# Patient Record
Sex: FEMALE
[De-identification: names, ages, dates, MRNs, and addresses within clinical notes are randomized per-mention and may not be internally consistent; named-entity substitution may affect disease eponyms.]

---

## 2018-10-22 ENCOUNTER — HOSPITAL ENCOUNTER (OUTPATIENT)
Dept: HOSPITAL 31 - C.SDS | Age: 63
Discharge: HOME | End: 2018-10-22
Attending: UROLOGY
Payer: MEDICAID

## 2018-10-22 VITALS
SYSTOLIC BLOOD PRESSURE: 136 MMHG | HEART RATE: 72 BPM | DIASTOLIC BLOOD PRESSURE: 82 MMHG | RESPIRATION RATE: 18 BRPM | TEMPERATURE: 98 F

## 2018-10-22 VITALS — OXYGEN SATURATION: 100 %

## 2018-10-22 DIAGNOSIS — D49.4: Primary | ICD-10-CM

## 2018-10-22 PROCEDURE — 52235 CYSTOSCOPY AND TREATMENT: CPT

## 2018-10-22 PROCEDURE — 88305 TISSUE EXAM BY PATHOLOGIST: CPT

## 2018-10-22 NOTE — [id number].[id number]
Surgeon's Initial Post Op Note





- Surgeon's Notes


Surgeon: Raymond


Assistant: TAMMY


Type of Anesthesia: General LMA


Anesthesia Administered By: Staff


Pre-Operative Diagnosis: Bladder mass


Operative Findings: Solid mass R bladder well away from R ureteral orriffice


Post-Operative Diagnosis: same


Operation Performed: Turbt medium


Specimen/Specimens Removed: tumor


Estimated Blood Loss: EBL {In ML}: 0


Blood Products Given: N/A


Drains Used: No Drains


Post-Op Condition: Good


Date of Surgery/Procedure: 10/22/18


Time of Surgery/Procedure: 15:33

## 2018-10-30 NOTE — OP
PROCEDURE DATE:  10/22/2018



PREOPERATIVE DIAGNOSIS:  Bladder mass.



POSTOPERATIVE DIAGNOSIS:  Bladder mass.



PROCEDURE:  TURBT medium.



SURGEON:  Samson Andrade MD



FINDINGS:  A mass in the posterior wall of the bladder of unknown

significance.



DESCRIPTION OF PROCEDURE:  The procedure as follows:  The patient signed

the detailed informed consent.  After receiving full explanation of the

risks and complications of the procedure, she was then draped and prepped

in the usual manner in the lithotomy position.  She received prophylactic

antibiotics and was cystoscoped with a#21 Storz panendoscope.  The urethra

was normal.  The bladder was entered atraumatically with a single mass on

the prostate wall of the bladder.  It was raised and pedunculated, but did

not appear to be a normal transitional cell carcinoma.  The mass was then

resected, and the resected tissue was sent for pathologic analysis.  The

base was fulgurated extensively.  There was no injury to the ureteral

orifices or urethral sphincter.  The patient tolerated this procedure well.

It was elected not to leave the Melton catheter, and she was sent to

recovery room in good condition.





__________________________________________

Samson Andrade MD





DD:  10/29/2018 13:23:46

DT:  10/30/2018 1:18:28

Job # [id number]

## 2021-11-02 ENCOUNTER — NEW PATIENT (OUTPATIENT)
Dept: URBAN - METROPOLITAN AREA SURGICAL CENTER 72 | Facility: SURGICAL CENTER | Age: 66
End: 2021-11-02

## 2021-11-02 DIAGNOSIS — H25.813: ICD-10-CM

## 2021-11-02 DIAGNOSIS — H16.222: ICD-10-CM

## 2021-11-02 DIAGNOSIS — H43.393: ICD-10-CM

## 2021-11-02 DIAGNOSIS — H04.123: ICD-10-CM

## 2021-11-02 PROCEDURE — 92004 COMPRE OPH EXAM NEW PT 1/>: CPT

## 2021-11-02 ASSESSMENT — KERATOMETRY
OS_AXISANGLE_DEGREES: 139
OS_K2POWER_DIOPTERS: 46.25
OS_K1POWER_DIOPTERS: 46.00
OD_K1POWER_DIOPTERS: 46.00
OS_AXISANGLE2_DEGREES: 49
OD_K2POWER_DIOPTERS: 46.25
OD_AXISANGLE_DEGREES: 165
OD_AXISANGLE2_DEGREES: 75

## 2021-11-02 ASSESSMENT — VISUAL ACUITY
OS_CC: 20/40-1
OS_SC: 20/40
OD_SC: 20/40
OD_CC: 20/30-1

## 2021-11-02 ASSESSMENT — TONOMETRY
OS_IOP_MMHG: 16
OD_IOP_MMHG: 16

## 2022-05-05 ENCOUNTER — PROBLEM (OUTPATIENT)
Dept: URBAN - METROPOLITAN AREA SURGICAL CENTER 72 | Facility: SURGICAL CENTER | Age: 67
End: 2022-05-05

## 2022-05-05 DIAGNOSIS — H43.393: ICD-10-CM

## 2022-05-05 DIAGNOSIS — H25.813: ICD-10-CM

## 2022-05-05 PROCEDURE — 92012 INTRM OPH EXAM EST PATIENT: CPT

## 2022-05-05 ASSESSMENT — VISUAL ACUITY
OS_CC: 20/40
OD_CC: 20/40

## 2022-05-05 ASSESSMENT — TONOMETRY
OS_IOP_MMHG: 16
OD_IOP_MMHG: 16

## 2022-05-05 ASSESSMENT — KERATOMETRY
OS_AXISANGLE_DEGREES: 139
OD_K1POWER_DIOPTERS: 46.00
OD_K2POWER_DIOPTERS: 46.25
OS_AXISANGLE2_DEGREES: 49
OD_AXISANGLE2_DEGREES: 75
OD_AXISANGLE_DEGREES: 165
OS_K2POWER_DIOPTERS: 46.25
OS_K1POWER_DIOPTERS: 46.00

## 2022-09-19 ENCOUNTER — ESTABLISHED COMPREHENSIVE EXAM (OUTPATIENT)
Dept: URBAN - METROPOLITAN AREA SURGICAL CENTER 72 | Facility: SURGICAL CENTER | Age: 67
End: 2022-09-19

## 2022-09-19 DIAGNOSIS — H16.222: ICD-10-CM

## 2022-09-19 DIAGNOSIS — H52.222: ICD-10-CM

## 2022-09-19 DIAGNOSIS — H52.03: ICD-10-CM

## 2022-09-19 DIAGNOSIS — H43.393: ICD-10-CM

## 2022-09-19 DIAGNOSIS — H52.4: ICD-10-CM

## 2022-09-19 DIAGNOSIS — H25.813: ICD-10-CM

## 2022-09-19 DIAGNOSIS — H04.123: ICD-10-CM

## 2022-09-19 PROCEDURE — 92015 DETERMINE REFRACTIVE STATE: CPT

## 2022-09-19 PROCEDURE — 92014 COMPRE OPH EXAM EST PT 1/>: CPT

## 2022-09-19 ASSESSMENT — KERATOMETRY
OS_AXISANGLE_DEGREES: 139
OD_K2POWER_DIOPTERS: 46.25
OS_K2POWER_DIOPTERS: 46.25
OS_AXISANGLE2_DEGREES: 49
OD_AXISANGLE_DEGREES: 165
OS_K1POWER_DIOPTERS: 46.00
OD_AXISANGLE2_DEGREES: 75
OD_K1POWER_DIOPTERS: 46.00

## 2022-09-19 ASSESSMENT — VISUAL ACUITY
OD_SC: 20/25-2
OS_CC: 20/40
OD_CC: 20/40
OS_SC: 20/30-2

## 2022-09-19 ASSESSMENT — TONOMETRY
OS_IOP_MMHG: 16
OD_IOP_MMHG: 16

## 2024-10-03 ENCOUNTER — ESTABLISHED COMPREHENSIVE EXAM (OUTPATIENT)
Dept: URBAN - METROPOLITAN AREA SURGICAL CENTER 72 | Facility: SURGICAL CENTER | Age: 69
End: 2024-10-03

## 2024-10-03 DIAGNOSIS — E11.9: ICD-10-CM

## 2024-10-03 DIAGNOSIS — H43.393: ICD-10-CM

## 2024-10-03 DIAGNOSIS — H25.813: ICD-10-CM

## 2024-10-03 DIAGNOSIS — H16.222: ICD-10-CM

## 2024-10-03 DIAGNOSIS — H04.123: ICD-10-CM

## 2024-10-03 PROCEDURE — 99214 OFFICE O/P EST MOD 30 MIN: CPT

## 2024-10-03 ASSESSMENT — KERATOMETRY
OS_AXISANGLE_DEGREES: 111
OS_K2POWER_DIOPTERS: 46.00
OS_K1POWER_DIOPTERS: 45.75
OS_AXISANGLE2_DEGREES: 21
OD_AXISANGLE2_DEGREES: 73
OD_K2POWER_DIOPTERS: 46.50
OD_AXISANGLE_DEGREES: 163
OD_K1POWER_DIOPTERS: 46.00

## 2024-10-03 ASSESSMENT — VISUAL ACUITY
OD_CC: 20/50
OS_CC: 20/50

## 2024-10-03 ASSESSMENT — TONOMETRY
OS_IOP_MMHG: 15
OD_IOP_MMHG: 17

## 2024-10-24 ENCOUNTER — FOLLOW UP (OUTPATIENT)
Dept: URBAN - METROPOLITAN AREA SURGICAL CENTER 72 | Facility: SURGICAL CENTER | Age: 69
End: 2024-10-24

## 2024-10-24 DIAGNOSIS — H52.4: ICD-10-CM

## 2024-10-24 DIAGNOSIS — H52.03: ICD-10-CM

## 2024-10-24 DIAGNOSIS — E11.9: ICD-10-CM

## 2024-10-24 PROCEDURE — 92012 INTRM OPH EXAM EST PATIENT: CPT

## 2024-10-24 PROCEDURE — 92015 DETERMINE REFRACTIVE STATE: CPT | Mod: NC

## 2024-10-24 ASSESSMENT — KERATOMETRY
OS_AXISANGLE_DEGREES: 128
OS_K1POWER_DIOPTERS: 46.00
OD_AXISANGLE2_DEGREES: 87
OD_K2POWER_DIOPTERS: 46.50
OD_K1POWER_DIOPTERS: 45.75
OS_K2POWER_DIOPTERS: 46.25
OD_AXISANGLE_DEGREES: 177
OS_AXISANGLE2_DEGREES: 38

## 2024-10-24 ASSESSMENT — TONOMETRY
OS_IOP_MMHG: 17
OD_IOP_MMHG: 19

## 2024-10-24 ASSESSMENT — VISUAL ACUITY
OS_CC: 20/40
OD_CC: 20/40-2

## 2024-12-23 ENCOUNTER — FOLLOW UP (OUTPATIENT)
Dept: URBAN - METROPOLITAN AREA SURGICAL CENTER 72 | Facility: SURGICAL CENTER | Age: 69
End: 2024-12-23

## 2024-12-23 DIAGNOSIS — H25.813: ICD-10-CM

## 2024-12-23 DIAGNOSIS — E11.9: ICD-10-CM

## 2024-12-23 DIAGNOSIS — H52.03: ICD-10-CM

## 2024-12-23 DIAGNOSIS — H04.123: ICD-10-CM

## 2024-12-23 DIAGNOSIS — H52.4: ICD-10-CM

## 2024-12-23 PROCEDURE — 92015 DETERMINE REFRACTIVE STATE: CPT

## 2024-12-23 PROCEDURE — 99212 OFFICE O/P EST SF 10 MIN: CPT

## 2024-12-23 ASSESSMENT — TONOMETRY
OS_IOP_MMHG: 14
OD_IOP_MMHG: 14

## 2024-12-23 ASSESSMENT — KERATOMETRY
OS_K1POWER_DIOPTERS: 46.00
OD_K2POWER_DIOPTERS: 46.00
OS_AXISANGLE2_DEGREES: 59
OD_AXISANGLE_DEGREES: 146
OD_K1POWER_DIOPTERS: 45.75
OS_AXISANGLE_DEGREES: 149
OD_AXISANGLE2_DEGREES: 56
OS_K2POWER_DIOPTERS: 46.25

## 2024-12-23 ASSESSMENT — VISUAL ACUITY
OD_CC: 20/50
OS_CC: 20/50

## (undated) RX ORDER — PREDNISOLONE ACETATE 10 MG/ML
1 SUSPENSION/ DROPS OPHTHALMIC
Start: 2022-05-05